# Patient Record
Sex: FEMALE | Race: WHITE
[De-identification: names, ages, dates, MRNs, and addresses within clinical notes are randomized per-mention and may not be internally consistent; named-entity substitution may affect disease eponyms.]

---

## 2018-03-26 ENCOUNTER — HOSPITAL ENCOUNTER (OUTPATIENT)
Dept: HOSPITAL 45 - C.MRIBC | Age: 43
Discharge: HOME | End: 2018-03-26
Attending: PSYCHIATRY & NEUROLOGY
Payer: COMMERCIAL

## 2018-03-26 DIAGNOSIS — E22.1: Primary | ICD-10-CM

## 2018-03-26 NOTE — DIAGNOSTIC IMAGING REPORT
MRI OF THE BRAIN AND PITUITARY WITH AND WITHOUT CONTRAST



CLINICAL HISTORY: Hyperprolactinemia.    



COMPARISON STUDY:  MRI of the brain November 18, 2016 and October 17, 2013.



TECHNIQUE: Utilizing a 1.5 Alana magnet, multiplanar, multi echo imaging of the

brain

and pituitary was performed pre and postcontrast administration with dynamic

enhancement. Injection of 8.5 cc of Gadavist IV was uneventful.



FINDINGS: There are no foci of restricted diffusion. No acute intracranial

hemorrhage, midline shift or mass effect is present. Ventricular system is

unremarkable. Basilar cisterns are patent. There are no extra axial collections.

Flow-voids for the major intracranial vessels are present. A 1 cm T2

hyperintense focus within the clivus is unchanged since MRI of October 17, 2013.

This is benign. A few small white matter T2 hyperintense foci are unchanged. No

new foci of signal abnormality present. Calvarial signal is maintained. Orbits

and sinuses are unremarkable. Asymmetric prominence of the right aspect of the

pituitary gland is again noted. Note is made of a 0.7 x 0.3 cm hypoenhancing

lesion within the right aspect of the gland shown best on coronal image 307.

This is shown on several of the dynamic postcontrast sequences. This was shown

on MRI of October 17, 2013 but not visualized on study of November 18, 2016. No

additional pituitary lesions are identified. Infundibulum is unremarkable. Optic

chiasm is normal.



IMPRESSION:  



1. 7 mm x 3 mm hypoenhancing focus within the right aspect of the pituitary

gland which favors a microadenoma which is similar to MRI of October 17, 2013.

Lesion not visualized on MRI of November 18, 2016.



2. No acute intracranial findings.













Electronically signed by:  Santiago Ochoa M.D.

3/26/2018 10:44 AM



Dictated Date/Time:  3/26/2018 10:06 AM